# Patient Record
Sex: FEMALE | Race: WHITE | NOT HISPANIC OR LATINO | Employment: OTHER | URBAN - METROPOLITAN AREA
[De-identification: names, ages, dates, MRNs, and addresses within clinical notes are randomized per-mention and may not be internally consistent; named-entity substitution may affect disease eponyms.]

---

## 2017-08-10 ENCOUNTER — PATIENT OUTREACH (OUTPATIENT)
Dept: ADMINISTRATIVE | Facility: HOSPITAL | Age: 62
End: 2017-08-10

## 2017-08-18 ENCOUNTER — PATIENT OUTREACH (OUTPATIENT)
Dept: ADMINISTRATIVE | Facility: HOSPITAL | Age: 62
End: 2017-08-18

## 2017-08-18 NOTE — LETTER
August 18, 2017    Jose Byrd  [address]  [address]  [place] [address]             [facility]  [address]  [place] [place] [address]  Phone: [phone] Dear Jose Byrd     In the spirit of maintaining your good health, our system indicates that you have not been seen in the office in over 12 months and due for a visit.     Our system indicates that you are due for the following:   Health Maintenance Due   Topic Date Due    TETANUS VACCINE  12/31/1973    Pap Smear with HPV Cotest  03/05/2010    Colonoscopy  03/04/2013    Mammogram  03/21/2013    Influenza Vaccine  08/01/2017       Jillian Bonilla MD would like you to schedule an appointment for an annual exam at your earliest convenience. If you have completed any of these health maintenance requirements at an outside facility, please contact our office so we may update your health record.    If your PRIMARY CARE PHYSICIAN has changed please contact your insurance company to reflect the correct physician.    If you have any issues or need assistance in scheduling this appointment, please call [phone]. Thank you for choosing [facility] for all your health care needs.     Lucy HOOKS LPN Care Coordinator  [facility]  [phone]